# Patient Record
Sex: MALE | Race: WHITE | Employment: STUDENT | URBAN - NONMETROPOLITAN AREA
[De-identification: names, ages, dates, MRNs, and addresses within clinical notes are randomized per-mention and may not be internally consistent; named-entity substitution may affect disease eponyms.]

---
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
## 2024-08-06 ENCOUNTER — HOSPITAL ENCOUNTER (EMERGENCY)
Facility: HOSPITAL | Age: 19
Discharge: HOME | End: 2024-08-06
Attending: NURSE PRACTITIONER | Admitting: NURSE PRACTITIONER
Payer: COMMERCIAL

## 2024-08-06 ENCOUNTER — APPOINTMENT (OUTPATIENT)
Dept: ULTRASOUND IMAGING | Facility: HOSPITAL | Age: 19
End: 2024-08-06
Attending: NURSE PRACTITIONER
Payer: COMMERCIAL

## 2024-08-06 VITALS
RESPIRATION RATE: 16 BRPM | SYSTOLIC BLOOD PRESSURE: 129 MMHG | OXYGEN SATURATION: 95 % | DIASTOLIC BLOOD PRESSURE: 81 MMHG | HEART RATE: 102 BPM | TEMPERATURE: 98.5 F

## 2024-08-06 DIAGNOSIS — R10.12 LEFT UPPER QUADRANT ABDOMINAL PAIN: ICD-10-CM

## 2024-08-06 LAB
ALBUMIN SERPL BCG-MCNC: 4.8 G/DL (ref 3.5–5.2)
ALP SERPL-CCNC: 61 U/L (ref 40–150)
ALT SERPL W P-5'-P-CCNC: 45 U/L (ref 0–70)
ANION GAP SERPL CALCULATED.3IONS-SCNC: 12 MMOL/L (ref 7–15)
AST SERPL W P-5'-P-CCNC: 35 U/L (ref 0–45)
BILIRUB SERPL-MCNC: 0.9 MG/DL
BUN SERPL-MCNC: 10.8 MG/DL (ref 6–20)
CALCIUM SERPL-MCNC: 10.2 MG/DL (ref 8.8–10.4)
CHLORIDE SERPL-SCNC: 100 MMOL/L (ref 98–107)
CREAT SERPL-MCNC: 0.97 MG/DL (ref 0.67–1.17)
EGFRCR SERPLBLD CKD-EPI 2021: >90 ML/MIN/1.73M2
ERYTHROCYTE [DISTWIDTH] IN BLOOD BY AUTOMATED COUNT: 12 % (ref 10–15)
GLUCOSE SERPL-MCNC: 115 MG/DL (ref 70–99)
HCO3 SERPL-SCNC: 26 MMOL/L (ref 22–29)
HCT VFR BLD AUTO: 50.6 % (ref 40–53)
HGB BLD-MCNC: 17.6 G/DL (ref 13.3–17.7)
HOLD SPECIMEN: NORMAL
LIPASE SERPL-CCNC: 65 U/L (ref 13–60)
MCH RBC QN AUTO: 29.7 PG (ref 26.5–33)
MCHC RBC AUTO-ENTMCNC: 34.8 G/DL (ref 31.5–36.5)
MCV RBC AUTO: 85 FL (ref 78–100)
PLATELET # BLD AUTO: 189 10E3/UL (ref 150–450)
POTASSIUM SERPL-SCNC: 4.5 MMOL/L (ref 3.4–5.3)
PROT SERPL-MCNC: 8.4 G/DL (ref 6.4–8.3)
RBC # BLD AUTO: 5.93 10E6/UL (ref 4.4–5.9)
SODIUM SERPL-SCNC: 138 MMOL/L (ref 135–145)
WBC # BLD AUTO: 7.5 10E3/UL (ref 4–11)

## 2024-08-06 PROCEDURE — 76705 ECHO EXAM OF ABDOMEN: CPT | Mod: TC

## 2024-08-06 PROCEDURE — 80053 COMPREHEN METABOLIC PANEL: CPT | Performed by: EMERGENCY MEDICINE

## 2024-08-06 PROCEDURE — 85027 COMPLETE CBC AUTOMATED: CPT | Performed by: NURSE PRACTITIONER

## 2024-08-06 PROCEDURE — 83690 ASSAY OF LIPASE: CPT | Performed by: NURSE PRACTITIONER

## 2024-08-06 PROCEDURE — 99284 EMERGENCY DEPT VISIT MOD MDM: CPT | Mod: 25 | Performed by: NURSE PRACTITIONER

## 2024-08-06 PROCEDURE — 80053 COMPREHEN METABOLIC PANEL: CPT | Performed by: NURSE PRACTITIONER

## 2024-08-06 PROCEDURE — 85027 COMPLETE CBC AUTOMATED: CPT | Performed by: EMERGENCY MEDICINE

## 2024-08-06 PROCEDURE — 83690 ASSAY OF LIPASE: CPT | Performed by: EMERGENCY MEDICINE

## 2024-08-06 PROCEDURE — 99284 EMERGENCY DEPT VISIT MOD MDM: CPT | Mod: 25

## 2024-08-06 PROCEDURE — 76705 ECHO EXAM OF ABDOMEN: CPT | Mod: 26 | Performed by: NURSE PRACTITIONER

## 2024-08-06 PROCEDURE — 36415 COLL VENOUS BLD VENIPUNCTURE: CPT | Performed by: EMERGENCY MEDICINE

## 2024-08-06 ASSESSMENT — ENCOUNTER SYMPTOMS
ALLERGIC/IMMUNOLOGIC NEGATIVE: 1
EYES NEGATIVE: 1
NAUSEA: 1
HEMATOLOGIC/LYMPHATIC NEGATIVE: 1
CONSTITUTIONAL NEGATIVE: 1
ABDOMINAL PAIN: 1
ENDOCRINE NEGATIVE: 1
CONSTIPATION: 1
VOMITING: 1
DIARRHEA: 0
NEUROLOGICAL NEGATIVE: 1
RESPIRATORY NEGATIVE: 1
PSYCHIATRIC NEGATIVE: 1
CARDIOVASCULAR NEGATIVE: 1
MUSCULOSKELETAL NEGATIVE: 1

## 2024-08-06 ASSESSMENT — COLUMBIA-SUICIDE SEVERITY RATING SCALE - C-SSRS
1. IN THE PAST MONTH, HAVE YOU WISHED YOU WERE DEAD OR WISHED YOU COULD GO TO SLEEP AND NOT WAKE UP?: NO
6. HAVE YOU EVER DONE ANYTHING, STARTED TO DO ANYTHING, OR PREPARED TO DO ANYTHING TO END YOUR LIFE?: NO
2. HAVE YOU ACTUALLY HAD ANY THOUGHTS OF KILLING YOURSELF IN THE PAST MONTH?: NO

## 2024-08-06 NOTE — ED PROVIDER NOTES
History     Chief Complaint   Patient presents with    Abdominal Pain     HPI  Eriberto Gonzalez is a 19 year old individual with history of mild persistent asthma comes in for left upper quadrant abdominal pain.  Patient states has been having intermittent left upper abdominal bloating and pain with some nausea and vomiting since last week.  Patient comes in as he wants to be checked out before he goes back to college.  No fever or chills.  Did have emesis this morning.  Currently is feeling normal.  Last bowel movement was a couple of days ago.  States his urine is a little bit darker than usual.  Does admit to good hydration.  No alcohol use reported.    Allergies:  Allergies   Allergen Reactions    Cetirizine Hcl Swelling     throat    Iodine      Rash    Other [No Clinical Screening - See Comments] Hives     Tree nuts.    Zithromax [Azithromycin Dihydrate] Swelling     throat       Problem List:    Patient Active Problem List    Diagnosis Date Noted    Mild persistent asthma with acute exacerbation 06/13/2008     Priority: Medium     Overview:    IMO Update          Past Medical History:    History reviewed. No pertinent past medical history.    Past Surgical History:    History reviewed. No pertinent surgical history.    Family History:    History reviewed. No pertinent family history.    Social History:  Marital Status:  Single [1]  Social History     Tobacco Use    Smoking status: Never    Smokeless tobacco: Never   Substance Use Topics    Alcohol use: Not Currently    Drug use: Not Currently        Medications:    acetaminophen (TYLENOL) 500 MG tablet  albuterol (PROVENTIL) (2.5 MG/3ML) 0.083% neb solution  IBUPROFEN PO          Review of Systems   Constitutional: Negative.    HENT: Negative.     Eyes: Negative.    Respiratory: Negative.     Cardiovascular: Negative.    Gastrointestinal:  Positive for abdominal pain (Abdominal pain/bloating), constipation, nausea and vomiting. Negative for diarrhea.    Endocrine: Negative.    Genitourinary: Negative.    Musculoskeletal: Negative.    Skin: Negative.    Allergic/Immunologic: Negative.    Neurological: Negative.    Hematological: Negative.    Psychiatric/Behavioral: Negative.         Physical Exam   BP: 129/81  Pulse: 102  Temp: 98.5  F (36.9  C)  Resp: 16  SpO2: 95 %      GENERAL APPEARANCE:  The patient is a 19 year old well-developed, well-nourished individual in no acute distress that appears as stated age.  LUNGS:  Breathing is easy.  Breath sounds are equal and clear bilaterally.  No wheezes, rhonchi, or rales.  HEART:  Regular rate and rhythm with normal S1 and S2.  No murmurs, gallops, or rubs.  ABDOMEN:  Soft.  No mass, tenderness, guarding, or rebound.  No organomegaly or hernia.  Bowel sounds are present.  No CVA tenderness or flank mass.  No abdominal bruits or thrills present upon auscultation/palpation.  GENITOURINARY: No obvious anterior pelvic tenderness, hernia, mass noted to palpation.  NEUROLOGIC:  No focal sensory or motor deficits are noted.    PSYCHIATRIC:  The patient is awake, alert, and oriented x4.  Recent and remote memory is intact.  Appropriate mood and affect.  Calm and cooperative with history and physical exam.  SKIN:  Warm, dry, and well perfused.  Good turgor.  No lesions, nodules, or rashes are noted.  No bruising noted.      Comment: Discrepancies between my note and notes on behalf of the nursing team or other care providers are secondary to my findings reflecting my physical examination and questioning of the patient.  Any conflicting information provided is not in line with my examination of the patient.       ED Course     ED Course as of 08/06/24 1434   Tue Aug 06, 2024   1305 Labs ordered while patient in [facility].   1427 POCUS gallbladder ultrasound was conducted showing no obvious abnormality.  Unable to visualize common bile duct.  Lab work is benign other than very mildly elevated lipase of 65.  Discussed advanced imaging  with patient but this was deferred with joint decision-making.  Will discharge home on bland diet.  Advised patient to try Tums or famotidine OTC.  Close follow-up with PCP recommended.  Return precautions given.     POC US ABDOMEN LIMITED    Date/Time: 8/6/2024 2:30 PM    Performed by: Juanjose Antoine APRN CNP  Authorized by: Juanjose Antoine APRN CNP    Procedure details:     Indications: abdominal pain      Assessment for:  Gallstones    Hepatobiliary:  Visualized  Hepatobiliary findings:     Common bile duct:  Unable to visualize    Gallbladder wall:  Normal    Gallbladder stones: not identified      Intra-abdominal fluid: not identified      Sonographic Piña's sign: negative           Results for orders placed or performed during the hospital encounter of 08/06/24 (from the past 24 hour(s))   CBC with platelets   Result Value Ref Range    WBC Count 7.5 4.0 - 11.0 10e3/uL    RBC Count 5.93 (H) 4.40 - 5.90 10e6/uL    Hemoglobin 17.6 13.3 - 17.7 g/dL    Hematocrit 50.6 40.0 - 53.0 %    MCV 85 78 - 100 fL    MCH 29.7 26.5 - 33.0 pg    MCHC 34.8 31.5 - 36.5 g/dL    RDW 12.0 10.0 - 15.0 %    Platelet Count 189 150 - 450 10e3/uL   Comprehensive metabolic panel   Result Value Ref Range    Sodium 138 135 - 145 mmol/L    Potassium 4.5 3.4 - 5.3 mmol/L    Carbon Dioxide (CO2) 26 22 - 29 mmol/L    Anion Gap 12 7 - 15 mmol/L    Urea Nitrogen 10.8 6.0 - 20.0 mg/dL    Creatinine 0.97 0.67 - 1.17 mg/dL    GFR Estimate >90 >60 mL/min/1.73m2    Calcium 10.2 8.8 - 10.4 mg/dL    Chloride 100 98 - 107 mmol/L    Glucose 115 (H) 70 - 99 mg/dL    Alkaline Phosphatase 61 40 - 150 U/L    AST 35 0 - 45 U/L    ALT 45 0 - 70 U/L    Protein Total 8.4 (H) 6.4 - 8.3 g/dL    Albumin 4.8 3.5 - 5.2 g/dL    Bilirubin Total 0.9 <=1.2 mg/dL   Lipase   Result Value Ref Range    Lipase 65 (H) 13 - 60 U/L   [facility] Draw    Narrative    The following orders were created for panel order [facility] Draw.  Procedure                                Abnormality         Status                     ---------                               -----------         ------                     Extra Blue Top Tube[[id number]]                              Final result               Extra Red Top Tube[[id number]]                               Final result               Extra Green Top (Lithium...[[id number]]                                                 Extra Purple Top Tube[[id number]]                                                       Extra Green Top (Lithium...[[id number]]                      Final result                 Please view results for these tests on the individual orders.   Extra Blue Top Tube   Result Value Ref Range    Hold Specimen JIC    Extra Red Top Tube   Result Value Ref Range    Hold Specimen JIC    Extra Green Top (Lithium Heparin) ON ICE   Result Value Ref Range    Hold Specimen JIC        Medications - No data to display    Assessments & Plan (with Medical Decision Making)     I have reviewed the nursing notes.    I have reviewed the findings, diagnosis, plan and need for follow up with the patient.    Summary:  Patient presents to the ER today for abdominal pain with nausea and vomiting.  Potential diagnosis which have been considered and evaluated include bowel obstruction, pancreatitis, cholecystitis, appendicitis, pyelonephritis, UTI, ureteral stone, mesenteric ischemia, diverticulitis, as well as others. Many of these have been excluded using the various modalities and assessment as noted on the chart. At the present time, the diagnosis given seems to be the most likely intermittent left upper quadrant abdominal pain without etiology.  Upon arrival, vitals signs are normal.  The patient is alert and oriented.  Cardiac and respiratory examination normal.  No obvious abnormality on physical examination at this time.  Lab work was obtained while patient in lobby showing WBC of 7.5 with hemoglobin of 17.6.  Electrolytes, renal, hepatic functions  normal.  Does have mildly elevated lipase of 65.  UA not obtained.  POCUS gallbladder ultrasound was conducted showing no obvious abnormality.  Unable to visualize common bile duct.  At this time patient is asymptomatic.  Denies any alcohol use but is on Wegovy which can cause slower digestion with abdominal pain.  Last bowel movement was 2 days ago.  Discussed advanced imaging with patient but this was deferred with joint decision-making.  Will go home and do bland diet with good hydration.  Try OTC Tums/famotidine for possibility of heartburn or ulcer.  Close follow-up with PCP recommended.  Return to ER if new or worsening symptoms or any concerns.  Patient verbalized understand agrees with plan of care.  Patient discharged home.        Critical Care Time: None    Impression and plan discussed with patient. Questions answered, concerns addressed, indications for urgent re-evaluation reviewed, and  given. Patient/Parent/Caregiver agree with treatment plan and have no further questions at this time.  AVS provided at discharge.    This note was created by the Dragon Voice Dictation System. Inadvertent typographical errors, due to software recognition problems, may still exist.             New Prescriptions    No medications on file       Final diagnoses:   Left upper quadrant abdominal pain       8/6/2024   [facility]       Juanjose Antoine APRN CNP  08/06/24 [id number]

## 2024-08-06 NOTE — DISCHARGE INSTRUCTIONS
Try doing over-the-counter Tums or famotidine.  Diet change for the next couple of days.        [place] Diet:  A bland diet includes foods that are soft and low in fiber. If you are on a bland diet, you should NOT eat spicy, fried, or raw foods. You should not drink alcohol or drinks with caffeine in them.  Foods you can eat   Foods you can eat on a bland diet include:   Milk and other dairy products, low-fat or fat-free only    Cooked, canned, or frozen vegetables    Fruit juices and vegetable juices   Breads, crackers, and pasta made with refined white flour    Refined, hot cereals, such as Cream of Wheat   Lean, tender meats, such as poultry, whitefish, and shellfish that are steamed, baked, or grilled with no added fat    Creamy peanut butter    Pudding and custard    Eggs    Tofu    Soup, especially broth    Weak tea  An easy acronym to help remember this diet is BRAT.  Bananas, Rice, Applesauce, Toast.        Follow-up with your primary care provider for reevaluation.  Contact your primary care provider if you have any questions or concerns.  Do not hesitate to return to the ER if any new or worsening symptoms.     Please read the attached instructions (if any).  They highlight more specific treatments and interventions for you at home.              Thank you for letting me participate in your care and wish you a fast and uneventful recovery,    Juanjose ADAMS CNP    Do not hesitate to contact me with questions or concerns.  [email or web address]

## 2024-08-06 NOTE — Clinical Note
Eriberto Gonzalez was seen and treated in our emergency department on 8/6/2024.  He may return to work on 08/07/2024.       If you have any questions or concerns, please don't hesitate to call.      Juanjose Antoine, BRYAN CNP

## 2024-08-06 NOTE — ED TRIAGE NOTES
ADRIEN Walker CNP assessed patient in triage and determined patient not Urgent Care appropriate. Will be seen in Emergency Department.

## 2024-08-06 NOTE — ED TRIAGE NOTES
Patient presents with c/o vomiting and upper abdominal pain that started last week. Concerned that it may possibly be is gallbladder and wants to be checked out before going back to school. Does take omeprazole for acid reflux and on Wegovy Reports last BM 2 days ago, normal per patient, denies any urinary symptoms, denies any current pain.